# Patient Record
Sex: FEMALE | Race: WHITE | NOT HISPANIC OR LATINO | Employment: UNEMPLOYED | URBAN - METROPOLITAN AREA
[De-identification: names, ages, dates, MRNs, and addresses within clinical notes are randomized per-mention and may not be internally consistent; named-entity substitution may affect disease eponyms.]

---

## 2022-03-11 ENCOUNTER — HISTORICAL (OUTPATIENT)
Dept: ADMINISTRATIVE | Facility: HOSPITAL | Age: 87
End: 2022-03-11

## 2023-03-16 ENCOUNTER — HOSPITAL ENCOUNTER (EMERGENCY)
Facility: HOSPITAL | Age: 88
Discharge: HOME | End: 2023-03-16
Attending: EMERGENCY MEDICINE
Payer: MEDICARE

## 2023-03-16 VITALS
SYSTOLIC BLOOD PRESSURE: 132 MMHG | DIASTOLIC BLOOD PRESSURE: 68 MMHG | WEIGHT: 88 LBS | RESPIRATION RATE: 18 BRPM | HEIGHT: 60 IN | BODY MASS INDEX: 17.28 KG/M2 | TEMPERATURE: 98 F | OXYGEN SATURATION: 97 % | HEART RATE: 85 BPM

## 2023-03-16 DIAGNOSIS — E86.0 DEHYDRATION: Primary | ICD-10-CM

## 2023-03-16 DIAGNOSIS — R53.1 WEAKNESS: ICD-10-CM

## 2023-03-16 LAB
ALBUMIN SERPL-MCNC: 4.1 G/DL (ref 3.4–4.8)
ALBUMIN/GLOB SERPL: 1.3 RATIO (ref 1.1–2)
ALP SERPL-CCNC: 112 UNIT/L (ref 40–150)
ALT SERPL-CCNC: 41 UNIT/L (ref 0–55)
APPEARANCE UR: CLEAR
AST SERPL-CCNC: 72 UNIT/L (ref 5–34)
BASOPHILS # BLD AUTO: 0.11 X10(3)/MCL (ref 0–0.2)
BASOPHILS NFR BLD AUTO: 1 %
BILIRUB UR QL STRIP.AUTO: NEGATIVE MG/DL
BILIRUBIN DIRECT+TOT PNL SERPL-MCNC: 0.3 MG/DL
BNP BLD-MCNC: 58.5 PG/ML
BUN SERPL-MCNC: 21.8 MG/DL (ref 9.8–20.1)
CALCIUM SERPL-MCNC: 9.5 MG/DL (ref 8.4–10.2)
CHLORIDE SERPL-SCNC: 108 MMOL/L (ref 98–111)
CO2 SERPL-SCNC: 19 MMOL/L (ref 23–31)
COLOR UR AUTO: YELLOW
CREAT SERPL-MCNC: 1.43 MG/DL (ref 0.55–1.02)
EOSINOPHIL # BLD AUTO: 0.02 X10(3)/MCL (ref 0–0.9)
EOSINOPHIL NFR BLD AUTO: 0.2 %
ERYTHROCYTE [DISTWIDTH] IN BLOOD BY AUTOMATED COUNT: 12.8 % (ref 11.5–17)
GFR SERPLBLD CREATININE-BSD FMLA CKD-EPI: 34 MLS/MIN/1.73/M2
GLOBULIN SER-MCNC: 3.2 GM/DL (ref 2.4–3.5)
GLUCOSE SERPL-MCNC: 100 MG/DL (ref 75–121)
GLUCOSE UR QL STRIP.AUTO: NEGATIVE MG/DL
HCT VFR BLD AUTO: 38.6 % (ref 37–47)
HGB BLD-MCNC: 12.8 G/DL (ref 12–16)
IMM GRANULOCYTES # BLD AUTO: 0.05 X10(3)/MCL (ref 0–0.04)
IMM GRANULOCYTES NFR BLD AUTO: 0.5 %
KETONES UR QL STRIP.AUTO: NEGATIVE MG/DL
LEUKOCYTE ESTERASE UR QL STRIP.AUTO: NEGATIVE UNIT/L
LYMPHOCYTES # BLD AUTO: 0.78 X10(3)/MCL (ref 0.6–4.6)
LYMPHOCYTES NFR BLD AUTO: 7 %
MCH RBC QN AUTO: 32.5 PG
MCHC RBC AUTO-ENTMCNC: 33.2 G/DL (ref 33–36)
MCV RBC AUTO: 98 FL (ref 80–94)
MONOCYTES # BLD AUTO: 0.61 X10(3)/MCL (ref 0.1–1.3)
MONOCYTES NFR BLD AUTO: 5.5 %
NEUTROPHILS # BLD AUTO: 9.52 X10(3)/MCL (ref 2.1–9.2)
NEUTROPHILS NFR BLD AUTO: 85.8 %
NITRITE UR QL STRIP.AUTO: NEGATIVE
NRBC BLD AUTO-RTO: 0 %
PH UR STRIP.AUTO: 6 [PH]
PLATELET # BLD AUTO: 293 X10(3)/MCL (ref 130–400)
PMV BLD AUTO: 9.1 FL (ref 7.4–10.4)
POTASSIUM SERPL-SCNC: 4.9 MMOL/L (ref 3.5–5.1)
PROT SERPL-MCNC: 7.3 GM/DL (ref 5.8–7.6)
PROT UR QL STRIP.AUTO: NEGATIVE MG/DL
RBC # BLD AUTO: 3.94 X10(6)/MCL (ref 4.2–5.4)
RBC UR QL AUTO: NEGATIVE UNIT/L
SODIUM SERPL-SCNC: 140 MMOL/L (ref 132–146)
SP GR UR STRIP.AUTO: 1.02
TROPONIN I SERPL-MCNC: 0.04 NG/ML (ref 0–0.04)
UROBILINOGEN UR STRIP-ACNC: 0.2 MG/DL
WBC # SPEC AUTO: 11.1 X10(3)/MCL (ref 4.5–11.5)

## 2023-03-16 PROCEDURE — 25000003 PHARM REV CODE 250

## 2023-03-16 PROCEDURE — 84484 ASSAY OF TROPONIN QUANT: CPT

## 2023-03-16 PROCEDURE — 83880 ASSAY OF NATRIURETIC PEPTIDE: CPT

## 2023-03-16 PROCEDURE — 81003 URINALYSIS AUTO W/O SCOPE: CPT

## 2023-03-16 PROCEDURE — 80053 COMPREHEN METABOLIC PANEL: CPT

## 2023-03-16 PROCEDURE — 85025 COMPLETE CBC W/AUTO DIFF WBC: CPT

## 2023-03-16 PROCEDURE — 93010 EKG 12-LEAD: ICD-10-PCS | Mod: ,,, | Performed by: INTERNAL MEDICINE

## 2023-03-16 PROCEDURE — 93010 ELECTROCARDIOGRAM REPORT: CPT | Mod: ,,, | Performed by: INTERNAL MEDICINE

## 2023-03-16 PROCEDURE — P9612 CATHETERIZE FOR URINE SPEC: HCPCS

## 2023-03-16 PROCEDURE — 93005 ELECTROCARDIOGRAM TRACING: CPT

## 2023-03-16 PROCEDURE — 99284 EMERGENCY DEPT VISIT MOD MDM: CPT

## 2023-03-16 RX ORDER — SODIUM CHLORIDE 9 MG/ML
500 INJECTION, SOLUTION INTRAVENOUS
Status: COMPLETED | OUTPATIENT
Start: 2023-03-16 | End: 2023-03-16

## 2023-03-16 RX ADMIN — SODIUM CHLORIDE 500 ML: 9 INJECTION, SOLUTION INTRAVENOUS at 02:03

## 2023-03-16 NOTE — ED PROVIDER NOTES
Encounter Date: 3/16/2023       History     Chief Complaint   Patient presents with    Weakness     Pt to er with c/o weakness, decreased appetite and confusion. Pt dx with uti 5 days ago and was placed on bactrim.     89 y.o. White female with a history of Alzheimer's disease, hyperlipidemia, and hypertension presents to Emergency Department with a chief complaint of weakness. Symptoms began several days ago and have been constant since onset. Patient diagnosed with UTI on 03/11/23 and placed on Bactrim. Associated symptoms include increased confusion and lack of appetite. Denies fever, chills, CP, SOB, vomiting, or syncope. No other reported symptoms at this time      The history is provided by a friend and a relative. The history is limited by the condition of the patient. No  was used.   Illness   The current episode started several days ago. The problem occurs continuously. The problem has been unchanged. The pain is at a severity of 0/10. Pertinent negatives include no fever, no photophobia, no diarrhea, no nausea, no vomiting, no headaches, no cough, no shortness of breath and no wheezing. Services received include tests performed and medications given. Recently, medical care has been given at another facility.   Review of patient's allergies indicates:   Allergen Reactions    Lisinopril Other (See Comments)     Other reaction(s): COUGH  Caregiver does not know       Penicillins Other (See Comments)     Caregiver does not know       Past Medical History:   Diagnosis Date    Alzheimer's disease, unspecified (CODE)     Disorder of thyroid, unspecified     Essential (primary) hypertension     Mixed hyperlipidemia      No past surgical history on file.  No family history on file.     Review of Systems   Constitutional:  Positive for appetite change. Negative for chills, fatigue and fever.   Eyes:  Negative for photophobia.   Respiratory:  Negative for cough, shortness of breath and wheezing.     Cardiovascular:  Negative for chest pain and leg swelling.   Gastrointestinal:  Negative for diarrhea, nausea and vomiting.   Genitourinary:  Negative for difficulty urinating, dysuria and hematuria.        Foul smelling urine per family.    Neurological:  Positive for weakness. Negative for dizziness, syncope and headaches.   Psychiatric/Behavioral:  Positive for confusion.    All other systems reviewed and are negative.    Physical Exam     Initial Vitals [03/16/23 1238]   BP Pulse Resp Temp SpO2   (!) 181/96 68 20 98.4 °F (36.9 °C) 96 %      MAP       --         Physical Exam    Nursing note and vitals reviewed.  Constitutional: She is not diaphoretic. She is cooperative.  Non-toxic appearance. No distress.   Frail appearing elderly woman.    HENT:   Head: Normocephalic and atraumatic.   Right Ear: External ear normal.   Left Ear: External ear normal.   Nose: Nose normal.   Mouth/Throat: Oropharynx is clear and moist. No oropharyngeal exudate.   Eyes: Conjunctivae and EOM are normal. Pupils are equal, round, and reactive to light. Right eye exhibits no discharge. Left eye exhibits no discharge.   Neck: Neck supple. No thyromegaly present. No JVD present.   Normal range of motion.  Cardiovascular:  Normal rate, regular rhythm, S1 normal, S2 normal, normal heart sounds, intact distal pulses and normal pulses.           Pulmonary/Chest: Effort normal and breath sounds normal. No accessory muscle usage or stridor. No tachypnea. No respiratory distress. She has no decreased breath sounds. She has no wheezes. She has no rhonchi. She has no rales. She exhibits no tenderness.   Abdominal: Abdomen is soft. Bowel sounds are normal. She exhibits no distension. There is no abdominal tenderness. There is no guarding.   Musculoskeletal:      Cervical back: Normal range of motion and neck supple.     Neurological: She is alert. She is disoriented. No sensory deficit.   Patient is only oriented to person. Family reports this  is patient's baseline.    Skin: Skin is warm and dry. Capillary refill takes less than 2 seconds.       ED Course   Procedures  Labs Reviewed   COMPREHENSIVE METABOLIC PANEL - Abnormal; Notable for the following components:       Result Value    Carbon Dioxide 19 (*)     Blood Urea Nitrogen 21.8 (*)     Creatinine 1.43 (*)     Aspartate Aminotransferase 72 (*)     All other components within normal limits   CBC WITH DIFFERENTIAL - Abnormal; Notable for the following components:    RBC 3.94 (*)     MCV 98.0 (*)     Neut # 9.52 (*)     IG# 0.05 (*)     All other components within normal limits   TROPONIN I - Normal   B-TYPE NATRIURETIC PEPTIDE - Normal   CBC W/ AUTO DIFFERENTIAL    Narrative:     The following orders were created for panel order CBC auto differential.  Procedure                               Abnormality         Status                     ---------                               -----------         ------                     CBC with Differential[[id number]]        Abnormal            Final result                 Please view results for these tests on the individual orders.   URINALYSIS, REFLEX TO URINE CULTURE     EKG Readings: (Independently Interpreted)   Initial Reading: No STEMI. Rhythm: Normal Sinus Rhythm. Heart Rate: 85. Ectopy: No Ectopy. Conduction: Normal.     Imaging Results    None          Medications   0.9%  NaCl infusion (500 mLs Intravenous New Bag 3/16/23 1433)     Medical Decision Making:   History:   I obtained history from: someone other than patient.       <> Summary of History: Due to patient's history of Alzheimer's disease, discussed complaints and history with patient's daughter and sitter.   Old Records Summarized: records from clinic visits.       <> Summary of Records: Patient seen at  several days ago and diagnosed with UTI; placed on Bactrim.  Initial Assessment:   Frail appearing elderly woman presents to ER with sitter and daughter who reports that patient has been  experiencing generalized weakness, increased confusion, and lack of appetite since being diagnosed with UTI several days ago. Hx of Alzheimer's disease, patient's baseline is GCS 14. Patient only oriented to self during assessment, which is her baseline per family. No bizarre behavior noted. NAD noted. Reports she has been taking Bactrim as prescribed.   Differential Diagnosis:   UTI, Weakness, Dehydration   Clinical Tests:   Lab Tests: Ordered and Reviewed  Medical Tests: Ordered and Reviewed  ED Management:  Co-morbidities and/or factors adding to the complexity or risk for the patient?: none  Differential diagnoses: UTI, Weakness, Dehydration   Decision to obtain previous or outside records?: I reviewed previous records.  Chart Review (nursing home, outside records, CareEverywhere): yes  My EKG Interpretation: see above  Labs/imaging/other tests obtained/considered (risk/benefits of testing discussed): cbc, cmp, bnp, trop, ekg, ua  Labs/tests intepretation: Cr increased & CO2- 19. UA unremarkable. Informed family and sitter of results.   My independent imaging interpretation: none  Treatment/interventions, IV fluids, IV medications: IV fluids given in ER.   Complex management (IV controlled substances, went to OR, DNR, meds requiring monitoring, transfer, etc)?: none  Workup/treatment affected by social determinants of health?:none   Point of care US done/interpretation: none  Consults/radiologist/EMS/social work/family discussion/alternate history: none  Advanced care planning/end of life discussion: none  Shared decision making: Discussed plan of care and interventions with patient and family. Agreed to and aware of plan of care. Comfortable being discharged home.  ETOH/smoking/drug cessation discussion: none  Dispo: Patient discharged home. Patient denies new or additional complaints; no further tests indicated at this time. Verbalized understanding of instructions. No emergent or apparent distress noted prior  to discharge. To follow up with PCP in 1 week as needed. Strict ER return precautions given.       Other:   I discussed test(s) with the performing physician.       <> Summary of the Findings: Discussed patient's complaints and results with Dr. Reece, instructed to give IV fluids and disposition home with PCP follow up appropriate. No further recommendation or instruction given.            ED Course as of 03/16/23 1516   Thu Mar 16, 2023   1436 Creatinine(!): 1.43 [JA]   1436 CO2(!): 19 [JA]   1445 Patient requesting food and water. Reports she is hungry. [JA]      ED Course User Index  [JA] Giovanna Singletary NP                 Clinical Impression:   Final diagnoses:  [R53.1] Weakness  [E86.0] Dehydration (Primary)        ED Disposition Condition    Discharge Stable          ED Prescriptions    None       Follow-up Information       Follow up With Specialties Details Why Contact Info    PCP  Call in 1 week As needed, If symptoms worsen     [facility] - Emergency Dept Emergency Medicine Go to  If symptoms worsen, As needed [id number]  [facility] [id number] [phone]             Giovanna Singletary NP  03/16/23 1515

## 2023-03-16 NOTE — ED TRIAGE NOTES
Pt to er with c/o weakness, decreased appetite and confusion. Pt dx with uti 5 days ago and was placed on bactrim.